# Patient Record
Sex: FEMALE | URBAN - METROPOLITAN AREA
[De-identification: names, ages, dates, MRNs, and addresses within clinical notes are randomized per-mention and may not be internally consistent; named-entity substitution may affect disease eponyms.]

---

## 2017-08-17 NOTE — PATIENT DISCUSSION
Hyperopia Counseling: The diagnosis of hyperopia (farsightedness) was explained to the patient. Options for the correction of the patient's hyperopia which may include glasses, contacts or elective refractive surgery were discussed. Return for follow-up as scheduled.

## 2020-12-22 ENCOUNTER — IMPORTED ENCOUNTER (OUTPATIENT)
Dept: URBAN - METROPOLITAN AREA CLINIC 50 | Facility: CLINIC | Age: 75
End: 2020-12-22

## 2021-02-02 ENCOUNTER — IMPORTED ENCOUNTER (OUTPATIENT)
Dept: URBAN - METROPOLITAN AREA CLINIC 50 | Facility: CLINIC | Age: 76
End: 2021-02-02

## 2021-04-17 ASSESSMENT — VISUAL ACUITY
OD_CC: J1+
OS_CC: 20/30
OS_CC: 20/30
OS_CC: J1+
OD_CC: 20/40
OD_CC: 20/40

## 2021-04-17 ASSESSMENT — TONOMETRY
OD_IOP_MMHG: 18
OD_IOP_MMHG: 13
OD_IOP_MMHG: 17
OS_IOP_MMHG: 17
OS_IOP_MMHG: 16
OS_IOP_MMHG: 16
OD_IOP_MMHG: 14
OS_IOP_MMHG: 18

## 2021-04-17 ASSESSMENT — PACHYMETRY
OS_CT_UM: 568
OD_CT_UM: 625

## 2021-10-25 NOTE — PATIENT DISCUSSION
Pt. is leaning towards Custom loulou OU. Pt. will be expedite per [facility] for sx on 10/28/21 and 11/4/21.

## 2021-10-28 NOTE — PATIENT DISCUSSION
Pt. is leaning towards Custom loulou [facility]. Pt. will be expedite per [id number] for sx on 10/28/21 and 11/4/21.

## 2021-10-28 NOTE — PATIENT DISCUSSION
Pt. is leaning towards Custom loulou OU. Pt. will be expedite per [facility] for sx on 10/28/21 and 11/4/21.

## 2021-10-28 NOTE — PATIENT DISCUSSION
Patient advised of the right to post-operative care by the surgeon. Patient is fully informed of, and agreed to, co-management with their primary optometric physician. Post-operative care by the surgeon is not medically necessary and co-management is clinically appropriate. Patient has received itemization of fees related to cataract surgery. Transfer of care letter completed for the patient. Transfer care of left eye to Dr. Jamie Colón on *. Patient instructed to call immediately if any new distortion, blurring, decreased vision or eye pain.

## 2021-10-29 NOTE — PATIENT DISCUSSION
Pt. is leaning towards Custom loulou OU. Pt. will be expedite per [facility] for sx on 10/28/21 and 11/4/21.

## 2021-11-04 NOTE — PATIENT DISCUSSION
Pt. is leaning towards Custom loulou OU. Pt. will be expedite per [facility] for sx on 10/28/21 and 11/4/21.

## 2021-11-04 NOTE — PATIENT DISCUSSION
Pt. is leaning towards Custom loulou OU. Pt. will be expedite per [facility] for sx on 10/28/21 and 11/4/21.

## 2021-11-04 NOTE — PATIENT DISCUSSION
Pt. is leaning towards Custom loulou OU. Pt. will be expedite per [facility] for sx on 10/28/21 and 11/4/21.

## 2021-11-04 NOTE — PATIENT DISCUSSION
Cataract surgery has been performed in the first eye and activities of daily living are still impaired. The patient would like to proceed with cataract surgery in the second eye as scheduled. The patient elects [facility], goal of loulou.

## 2021-11-05 NOTE — PATIENT DISCUSSION
Pt. is leaning towards Custom loulou OU. Pt. will be expedite per [facility] for sx on 10/28/21 and 11/4/21.

## 2021-11-22 ENCOUNTER — PREPPED CHART (OUTPATIENT)
Dept: URBAN - METROPOLITAN AREA CLINIC 53 | Facility: CLINIC | Age: 76
End: 2021-11-22

## 2021-11-30 ENCOUNTER — ANNUAL COMPREHENSIVE EXAM (OUTPATIENT)
Dept: URBAN - METROPOLITAN AREA CLINIC 53 | Facility: CLINIC | Age: 76
End: 2021-11-30

## 2021-11-30 DIAGNOSIS — E11.9: ICD-10-CM

## 2021-11-30 DIAGNOSIS — E11.3291: ICD-10-CM

## 2021-11-30 DIAGNOSIS — H26.492: ICD-10-CM

## 2021-11-30 DIAGNOSIS — H40.1134: ICD-10-CM

## 2021-11-30 DIAGNOSIS — Z79.4: ICD-10-CM

## 2021-11-30 DIAGNOSIS — H43.813: ICD-10-CM

## 2021-11-30 PROCEDURE — 76514 ECHO EXAM OF EYE THICKNESS: CPT

## 2021-11-30 PROCEDURE — 92134 CPTRZ OPH DX IMG PST SGM RTA: CPT

## 2021-11-30 PROCEDURE — 92014 COMPRE OPH EXAM EST PT 1/>: CPT

## 2021-11-30 ASSESSMENT — TONOMETRY
OD_IOP_MMHG: 17
OS_IOP_MMHG: 17
OS_IOP_MMHG: 12
OD_IOP_MMHG: 11

## 2021-11-30 ASSESSMENT — VISUAL ACUITY
OS_PH: 20/40-1
OD_GLARE: 20/40-2
OU_CC: J1+
OS_GLARE: 20/40
OS_GLARE: 20/40
OS_CC: 20/50-2
OD_CC: 20/40
OD_GLARE: 20/40-2

## 2022-02-15 NOTE — PATIENT DISCUSSION
See PCO. Will do YAG laser OU. If patient is still unhappy after YAG, please send back to [facility] for i-Lasik after MR is stable.

## 2022-10-14 ENCOUNTER — ESTABLISHED PATIENT (OUTPATIENT)
Dept: URBAN - METROPOLITAN AREA CLINIC 50 | Facility: CLINIC | Age: 77
End: 2022-10-14

## 2022-10-14 DIAGNOSIS — H47.013: ICD-10-CM

## 2022-10-14 DIAGNOSIS — Z79.4: ICD-10-CM

## 2022-10-14 PROCEDURE — 92012 INTRM OPH EXAM EST PATIENT: CPT

## 2022-10-14 ASSESSMENT — TONOMETRY
OD_IOP_MMHG: 08
OS_IOP_MMHG: 09
OS_IOP_MMHG: 14
OD_IOP_MMHG: 14

## 2022-10-14 ASSESSMENT — VISUAL ACUITY
OS_CC: 20/40-2
OD_CC: 20/30-2
OU_CC: 20/30-1